# Patient Record
Sex: FEMALE | Race: WHITE | Employment: UNEMPLOYED | URBAN - METROPOLITAN AREA
[De-identification: names, ages, dates, MRNs, and addresses within clinical notes are randomized per-mention and may not be internally consistent; named-entity substitution may affect disease eponyms.]

---

## 2018-11-10 ENCOUNTER — APPOINTMENT (OUTPATIENT)
Dept: GENERAL RADIOLOGY | Age: 58
End: 2018-11-10
Payer: MEDICARE

## 2018-11-10 ENCOUNTER — HOSPITAL ENCOUNTER (EMERGENCY)
Age: 58
Discharge: HOME | End: 2018-11-10
Payer: MEDICARE

## 2018-11-10 VITALS
TEMPERATURE: 98.6 F | SYSTOLIC BLOOD PRESSURE: 155 MMHG | HEART RATE: 80 BPM | RESPIRATION RATE: 16 BRPM | OXYGEN SATURATION: 95 % | BODY MASS INDEX: 31.18 KG/M2 | HEIGHT: 66 IN | WEIGHT: 194 LBS | DIASTOLIC BLOOD PRESSURE: 83 MMHG

## 2018-11-10 DIAGNOSIS — M79.672 LEFT FOOT PAIN: Primary | ICD-10-CM

## 2018-11-10 PROCEDURE — 99283 EMERGENCY DEPT VISIT LOW MDM: CPT

## 2018-11-10 PROCEDURE — 73630 X-RAY EXAM OF FOOT: CPT

## 2018-11-10 RX ORDER — IBUPROFEN 600 MG/1
600 TABLET ORAL ONCE
Status: DISCONTINUED | OUTPATIENT
Start: 2018-11-10 | End: 2018-11-10 | Stop reason: HOSPADM

## 2018-11-10 ASSESSMENT — PAIN DESCRIPTION - LOCATION: LOCATION: FOOT

## 2018-11-10 ASSESSMENT — PAIN DESCRIPTION - ORIENTATION: ORIENTATION: LEFT

## 2018-11-10 ASSESSMENT — PAIN DESCRIPTION - PAIN TYPE: TYPE: ACUTE PAIN

## 2018-11-10 ASSESSMENT — PAIN SCALES - GENERAL: PAINLEVEL_OUTOF10: 6

## 2018-11-11 ASSESSMENT — ENCOUNTER SYMPTOMS
NAUSEA: 0
VOMITING: 0

## 2018-11-11 NOTE — ED PROVIDER NOTES
ALCOHOL, ANESTHETIC, (ZILACTIN) 10 % GEL Take by mouth       !! - Potential duplicate medications found. Please discuss with provider. Review of Systems:  Review of Systems   Constitutional: Negative for activity change, appetite change and fever. Gastrointestinal: Negative for nausea and vomiting. Musculoskeletal: Positive for arthralgias. Skin: Negative for wound. Neurological: Negative for weakness and numbness. Positives and Pertinent negatives as per HPI. Except as noted above in the ROS, problem specific ROS was completed and is negative. Physical Exam:  Physical Exam   Constitutional: She is oriented to person, place, and time. She appears well-developed and well-nourished. HENT:   Head: Normocephalic and atraumatic. Right Ear: External ear normal.   Left Ear: External ear normal.   Nose: Nose normal.   Eyes: Right eye exhibits no discharge. Left eye exhibits no discharge. Neck: Normal range of motion. Neck supple. No tracheal deviation present. Cardiovascular: Intact distal pulses. Pulmonary/Chest: Effort normal. No respiratory distress. Musculoskeletal: Normal range of motion. There is minimal tenderness over the dorsum of the left foot, there is no overlying erythema, edema, ecchymosis or warmth. Dorsalis pedis and posterior tibialis pulse are 2+. Normal sensation light touch. Neurologically intact. Neurological: She is alert and oriented to person, place, and time. Skin: Skin is warm and dry. She is not diaphoretic. Psychiatric: She has a normal mood and affect. Her behavior is normal.   Nursing note and vitals reviewed.       MEDICAL DECISION MAKING    Vitals:    Vitals:    11/10/18 2036 11/10/18 2151   BP: (!) 156/107 (!) 155/83   Pulse: 80 80   Resp: 16    Temp: 98.6 °F (37 °C)    TempSrc: Infrared    SpO2: 95%    Weight: 194 lb (88 kg)    Height: 5' 6\" (1.676 m)        LABS:Labs Reviewed - No data to display     Remainder of labs reviewed and

## 2020-03-09 ENCOUNTER — APPOINTMENT (OUTPATIENT)
Dept: CT IMAGING | Age: 60
End: 2020-03-09
Payer: MEDICARE

## 2020-03-09 ENCOUNTER — APPOINTMENT (OUTPATIENT)
Dept: GENERAL RADIOLOGY | Age: 60
End: 2020-03-09
Payer: MEDICARE

## 2020-03-09 ENCOUNTER — HOSPITAL ENCOUNTER (EMERGENCY)
Age: 60
Discharge: HOME | End: 2020-03-09
Attending: EMERGENCY MEDICINE
Payer: MEDICARE

## 2020-03-09 VITALS
DIASTOLIC BLOOD PRESSURE: 78 MMHG | TEMPERATURE: 98.3 F | HEART RATE: 77 BPM | SYSTOLIC BLOOD PRESSURE: 129 MMHG | OXYGEN SATURATION: 99 % | RESPIRATION RATE: 18 BRPM

## 2020-03-09 LAB
A/G RATIO: 1.2 (ref 1.1–2.2)
ALBUMIN SERPL-MCNC: 3.5 G/DL (ref 3.4–5)
ALP BLD-CCNC: 59 U/L (ref 40–129)
ALT SERPL-CCNC: 19 U/L (ref 10–40)
ANION GAP SERPL CALCULATED.3IONS-SCNC: 14 MMOL/L (ref 3–16)
AST SERPL-CCNC: 34 U/L (ref 15–37)
BACTERIA: ABNORMAL /HPF
BASOPHILS ABSOLUTE: 0 K/UL (ref 0–0.2)
BASOPHILS RELATIVE PERCENT: 0.2 %
BILIRUB SERPL-MCNC: 0.3 MG/DL (ref 0–1)
BILIRUBIN URINE: NEGATIVE
BLOOD, URINE: NEGATIVE
BUN BLDV-MCNC: 13 MG/DL (ref 7–20)
CALCIUM SERPL-MCNC: 8.4 MG/DL (ref 8.3–10.6)
CASTS: ABNORMAL /LPF
CHLORIDE BLD-SCNC: 97 MMOL/L (ref 99–110)
CLARITY: ABNORMAL
CO2: 20 MMOL/L (ref 21–32)
COLOR: YELLOW
CREAT SERPL-MCNC: 1.2 MG/DL (ref 0.6–1.1)
EOSINOPHILS ABSOLUTE: 0 K/UL (ref 0–0.6)
EOSINOPHILS RELATIVE PERCENT: 0 %
EPITHELIAL CELLS, UA: 4 /HPF (ref 0–5)
GFR AFRICAN AMERICAN: 56
GFR NON-AFRICAN AMERICAN: 46
GLOBULIN: 3 G/DL
GLUCOSE BLD-MCNC: 92 MG/DL (ref 70–99)
GLUCOSE URINE: NEGATIVE MG/DL
HCT VFR BLD CALC: 35.5 % (ref 36–48)
HEMOGLOBIN: 11.5 G/DL (ref 12–16)
KETONES, URINE: NEGATIVE MG/DL
LEUKOCYTE ESTERASE, URINE: ABNORMAL
LYMPHOCYTES ABSOLUTE: 0.7 K/UL (ref 1–5.1)
LYMPHOCYTES RELATIVE PERCENT: 20.6 %
MCH RBC QN AUTO: 28.3 PG (ref 26–34)
MCHC RBC AUTO-ENTMCNC: 32.5 G/DL (ref 31–36)
MCV RBC AUTO: 86.9 FL (ref 80–100)
MICROSCOPIC EXAMINATION: YES
MONOCYTES ABSOLUTE: 0.7 K/UL (ref 0–1.3)
MONOCYTES RELATIVE PERCENT: 21 %
NEUTROPHILS ABSOLUTE: 2 K/UL (ref 1.7–7.7)
NEUTROPHILS RELATIVE PERCENT: 58.2 %
NITRITE, URINE: NEGATIVE
PDW BLD-RTO: 15.3 % (ref 12.4–15.4)
PH UA: 6 (ref 5–8)
PLATELET # BLD: 128 K/UL (ref 135–450)
PMV BLD AUTO: 7.1 FL (ref 5–10.5)
POTASSIUM REFLEX MAGNESIUM: 4.1 MMOL/L (ref 3.5–5.1)
PRO-BNP: 169 PG/ML (ref 0–124)
PROTEIN UA: 30 MG/DL
RBC # BLD: 4.09 M/UL (ref 4–5.2)
RBC UA: 2 /HPF (ref 0–4)
SODIUM BLD-SCNC: 131 MMOL/L (ref 136–145)
SPECIFIC GRAVITY UA: 1.01 (ref 1–1.03)
TOTAL PROTEIN: 6.5 G/DL (ref 6.4–8.2)
TROPONIN: <0.01 NG/ML
URINE REFLEX TO CULTURE: YES
URINE TYPE: ABNORMAL
UROBILINOGEN, URINE: 0.2 E.U./DL
WBC # BLD: 3.5 K/UL (ref 4–11)
WBC UA: 15 /HPF (ref 0–5)

## 2020-03-09 PROCEDURE — 85025 COMPLETE CBC W/AUTO DIFF WBC: CPT

## 2020-03-09 PROCEDURE — 84484 ASSAY OF TROPONIN QUANT: CPT

## 2020-03-09 PROCEDURE — 70450 CT HEAD/BRAIN W/O DYE: CPT

## 2020-03-09 PROCEDURE — 80053 COMPREHEN METABOLIC PANEL: CPT

## 2020-03-09 PROCEDURE — 99284 EMERGENCY DEPT VISIT MOD MDM: CPT

## 2020-03-09 PROCEDURE — 81001 URINALYSIS AUTO W/SCOPE: CPT

## 2020-03-09 PROCEDURE — 71045 X-RAY EXAM CHEST 1 VIEW: CPT

## 2020-03-09 PROCEDURE — 83880 ASSAY OF NATRIURETIC PEPTIDE: CPT

## 2020-03-09 PROCEDURE — 73562 X-RAY EXAM OF KNEE 3: CPT

## 2020-03-09 PROCEDURE — 93005 ELECTROCARDIOGRAM TRACING: CPT | Performed by: PHYSICIAN ASSISTANT

## 2020-03-09 PROCEDURE — 87086 URINE CULTURE/COLONY COUNT: CPT

## 2020-03-09 PROCEDURE — 72125 CT NECK SPINE W/O DYE: CPT

## 2020-03-09 PROCEDURE — 6370000000 HC RX 637 (ALT 250 FOR IP): Performed by: EMERGENCY MEDICINE

## 2020-03-09 RX ORDER — CIPROFLOXACIN 500 MG/1
500 TABLET, FILM COATED ORAL ONCE
Status: COMPLETED | OUTPATIENT
Start: 2020-03-09 | End: 2020-03-09

## 2020-03-09 RX ORDER — CIPROFLOXACIN 500 MG/1
500 TABLET, FILM COATED ORAL 2 TIMES DAILY
Qty: 20 TABLET | Refills: 0 | Status: SHIPPED | OUTPATIENT
Start: 2020-03-09 | End: 2020-03-19

## 2020-03-09 RX ORDER — ACETAMINOPHEN 500 MG
1000 TABLET ORAL ONCE
Status: COMPLETED | OUTPATIENT
Start: 2020-03-09 | End: 2020-03-09

## 2020-03-09 RX ADMIN — ACETAMINOPHEN 1000 MG: 500 TABLET, FILM COATED ORAL at 17:20

## 2020-03-09 RX ADMIN — CIPROFLOXACIN HYDROCHLORIDE 500 MG: 500 TABLET, FILM COATED ORAL at 17:20

## 2020-03-09 NOTE — ED NOTES
PT placed in wheel chair and given warm blanket. Discharge instructions reviewed. PT placed in main ED waiting room for transportation to pick her up.    remains with PT.      Aaliyah Ortiz RN  03/09/20 7718

## 2020-03-09 NOTE — ED NOTES
PT unable to understand and/or answer questions appropriately. PT states she was falling down all the time, then stated this was the first time. PT unsure if she was in a chair or going to the restroom when she fell. History of dementia. PT unable to rate pain but consistently states it is her right knee that hurts. PT kellymike removed. Small abrasion to right knee.       Juliane Contreras RN  03/09/20 [address] [facility],[address], RN  03/09/20 2468       Juliane Contreras RN  03/09/20 5136

## 2020-03-09 NOTE — ED PROVIDER NOTES
[facility]        Pt Name: Emeli Mcadams  MRN: [id number]  [place] 1960  Date of evaluation: 3/9/2020  Provider: MAHENDRA Izaguirre  PCP: Serenity Hansen have seen and evaluated this patient with my supervising physician Sandie Sanchez       Chief Complaint   Patient presents with    Knee Pain     PT arrived via [address] with c/o right knee pain. PT states she fell but unsure of how or when. Hx deminitia. Caregiver unsure of PT falling. HISTORY OF PRESENT ILLNESS   (Location, Timing/Onset, Context/Setting, Quality, Duration, Modifying Factors, Severity, Associated Signs and Symptoms)  Note limiting factors. Emeli Mcadams is a 61 y.o. female with past medical history of dementia, GERD, schizophrenia, seizures who presents to the ED with complaint of knee pain. Patient has history of dementia and had a fall. Care provider unsure how patient fell. Patient states she felt dizzy before she fell. States she injured her right knee. Denies abrasion or laceration. Denies head trauma or loss of conscious. Patient poor historian given history of dementia. Denies chest pain, shortness of breath, nausea/vomiting, visual changes, speech services, numbness/tingling or lightheadedness/dizziness this time. Denies abdominal pain, urinary symptoms or changes in bowel movements. Denies any other injury or trauma throughout. No documented blood thinners. Nursing Notes were all reviewed and agreed with or any disagreements were addressed in the HPI. REVIEW OF SYSTEMS    (2-9 systems for level 4, 10 or more for level 5)     Review of Systems   Unable to perform ROS: Dementia       Positives and Pertinent negatives as per HPI. Except as noted above in the ROS, all other systems were reviewed and negative.        PAST MEDICAL HISTORY     Past Medical History:   Diagnosis Date    Arthritis     Dementia ([facility]     DYSPHAGIA, OROPHARYNGEAL     GERD (gastroesophageal reflux disease)     Hypocalcemia     Hypokalemia     Pancreatitis     Schizophrenia, paranoid type ([facility]     Seizures ([facility]     Vitamin deficiency          SURGICAL HISTORY     Past Surgical History:   Procedure Laterality Date    CHOLECYSTECTOMY      HYSTERECTOMY      TYMPANOPLASTY           CURRENTMEDICATIONS       Discharge Medication List as of 3/9/2020  5:17 PM      CONTINUE these medications which have NOT CHANGED    Details   sodium chloride (OCEAN, BABY AYR) 0.65 % nasal spray 1 spray by Nasal route dailyHistorical Med      !! lactulose (CHRONULAC) 10 GM/15ML solution Take 20 g by mouth 2 times dailyHistorical Med      ciprofloxacin-dexamethasone (CIPRODEX) 0.3-0.1 % otic suspension Place 4 drops into the left ear dailyHistorical Med      !! lamoTRIgine (LAMICTAL) 100 MG tablet Take 100 mg by mouth daily morningHistorical Med      phenol 1.4 % LIQD mouth spray Take 1 drop by mouth every 2 hours as needed for Sore ThroatHistorical Med      ibuprofen (ADVIL;MOTRIN) 400 MG tablet Take 400 mg by mouth every 12 hours as needed for PainHistorical Med      promethazine (PHENERGAN) 12.5 MG tablet Take 12.5 mg by mouth every 6 hours as needed for NauseaHistorical Med      BENZYL ALCOHOL, ANESTHETIC, (ZILACTIN) 10 % GEL Take by mouth      acetaminophen (TYLENOL) 325 MG tablet Take 650 mg by mouth every 6 hours as needed. aluminum & magnesium hydroxide-simethicone (MYLANTA) 400-400-40 MG/5ML SUSP Take 30 mLs by mouth every 4 hours as needed. benztropine (COGENTIN) 1 MG tablet Take 1 mg by mouth nightly. docusate sodium (COLACE) 100 MG capsule Take 100 mg by mouth 2 times daily. !! lactulose encephalopathy (ENULOSE) 10 GM/15ML SOLN Take 30 mLs by mouth 2 times daily. famotidine (PEPCID) 20 MG tablet Take 20 mg by mouth daily. folic acid (FOLVITE) 1 MG tablet Take 1 mg by mouth daily.       potassium chloride SA 1. Fall from slip, trip, or stumble, initial encounter    2. Contusion of right knee, initial encounter    3. Acute cystitis without hematuria          DISPOSITION/PLAN   DISPOSITION Decision To Discharge 03/09/2020 05:07:51 PM      PATIENT REFERREDTO:  Chaparro Espinoza    Schedule an appointment as soon as possible for a visit   For a Re-check in  3-5     days.     [facility] Emergency Department  [address] [facility]  [address] [facility] [address]  [phone]  Go to   As needed, If symptoms worsen      DISCHARGE MEDICATIONS:  Discharge Medication List as of 3/9/2020  5:17 PM      START taking these medications    Details   ciprofloxacin (CIPRO) 500 MG tablet Take 1 tablet by mouth 2 times daily for 10 days, Disp-20 tablet, R-0Print             DISCONTINUED MEDICATIONS:  Discharge Medication List as of 3/9/2020  5:17 PM                 (Please note that portions of this note were completed with a voice recognition program.  Efforts were made to edit the dictations but occasionally words are mis-transcribed.)    MAHENDRA Kay (electronically signed)          MAHENDRA De La Garza  03/09/20 2014

## 2020-03-09 NOTE — ED NOTES
PT medicated. Facility rep arranging for transportation back to facility. PIV removed, PT dressed.       Juliane Contreras RN  03/09/20 4251

## 2020-03-09 NOTE — ED PROVIDER NOTES
[facility] Emergency Department      Pt Name: Adam Hidalgo  MRN: [id number]  [place] 1960  Date of evaluation: 3/9/2020  Provider: Gust Nageotte, MD  I independently performed a history and physical on Adam Hidalgo. All diagnostic, treatment, and disposition decisions were made by myself in conjunction with the advanced practice provider. HPI: Adam Hidalgo presented with   Chief Complaint   Patient presents with    Knee Pain     PT arrived via [address] with c/o right knee pain. PT states she fell but unsure of how or when. Hx deminitia. Caregiver unsure of PT falling. Adam Hidalgo has a past medical history of Arthritis, Dementia ([address] DYSPHAGIA, OROPHARYNGEAL, GERD (gastroesophageal reflux disease), Hypocalcemia, Hypokalemia, Pancreatitis, Schizophrenia, paranoid type ([address] Seizures ([address] and Vitamin deficiency. She has a past surgical history that includes Tympanoplasty; Cholecystectomy; and Hysterectomy. No current facility-administered medications on file prior to encounter. Current Outpatient Medications on File Prior to Encounter   Medication Sig Dispense Refill    sodium chloride (OCEAN, BABY AYR) 0.65 % nasal spray 1 spray by Nasal route daily      lactulose (CHRONULAC) 10 GM/15ML solution Take 20 g by mouth 2 times daily      ciprofloxacin-dexamethasone (CIPRODEX) 0.3-0.1 % otic suspension Place 4 drops into the left ear daily      lamoTRIgine (LAMICTAL) 100 MG tablet Take 100 mg by mouth daily morning      phenol 1.4 % LIQD mouth spray Take 1 drop by mouth every 2 hours as needed for Sore Throat      ibuprofen (ADVIL;MOTRIN) 400 MG tablet Take 400 mg by mouth every 12 hours as needed for Pain      promethazine (PHENERGAN) 12.5 MG tablet Take 12.5 mg by mouth every 6 hours as needed for Nausea      BENZYL ALCOHOL, ANESTHETIC, (ZILACTIN) 10 % GEL Take by mouth      acetaminophen (TYLENOL) 325 MG tablet Take 650 mg by mouth every 6 hours as needed.       aluminum & magnesium hydroxide-simethicone (MYLANTA) 400-400-40 MG/5ML SUSP Take 30 mLs by mouth every 4 hours as needed.  benztropine (COGENTIN) 1 MG tablet Take 1 mg by mouth nightly.  docusate sodium (COLACE) 100 MG capsule Take 100 mg by mouth 2 times daily.  lactulose encephalopathy (ENULOSE) 10 GM/15ML SOLN Take 30 mLs by mouth 2 times daily.  famotidine (PEPCID) 20 MG tablet Take 20 mg by mouth daily.  folic acid (FOLVITE) 1 MG tablet Take 1 mg by mouth daily.  potassium chloride SA (KLOR-CON M20) 20 MEQ tablet Take 20 mEq by mouth daily.  lamoTRIgine (LAMICTAL) 150 MG tablet Take 150 mg by mouth every evening.  levETIRAcetam (KEPPRA) 750 MG tablet Take 750 mg by mouth 2 times daily.  LORazepam (ATIVAN) 1 MG tablet Take 1 mg by mouth 2 times daily.  calcium-vitamin D (OSCAL-500) 500-200 MG-UNIT per tablet Take 1 tablet by mouth 2 times daily.  risperiDONE (RISPERDAL) 3 MG tablet Take 3 mg by mouth 2 times daily.  sertraline (ZOLOFT) 100 MG tablet Take 100 mg by mouth daily.  vitamin D (CHOLECALCIFEROL) 400 UNITS TABS tablet Take 400 Units by mouth 2 times daily.  vitamin E 400 UNIT capsule Take 400 Units by mouth daily. PHYSICAL EXAM  Vitals: /78   Pulse 73   Temp 98.3 °F (36.8 °C) (Infrared)   Resp 18   SpO2 96%   Constitutional:  61 y.o. female alert  HENT:  Atraumatic, oral mucosa moist  Neck:  No visible JVD, supple  Chest/Lungs:  Respiratory effort normal, faint wheeze, regular  Abdomen:  Non-distended, soft, NT  Back:  No gross deformity  Extremities:  Normal tone and perfusion, abrasion to right knee, no sts    Medical Decision Making and Plan: Briefly, this is an 61 y. o.female who presented with concern for fall. She lives in a assisted facility and the supervisor from the facility is with her. The patient does ambulate with a walker at times. She took the fall today. She has had some frequency of urination.

## 2020-03-09 NOTE — ED NOTES
Bed: 29  Expected date:   Expected time:   Means of arrival:   Comments:  [place] knee pain     Marsha Purvis RN  03/09/20 9438

## 2020-03-10 LAB — URINE CULTURE, ROUTINE: NORMAL

## 2020-03-11 LAB
EKG ATRIAL RATE: 71 BPM
EKG DIAGNOSIS: NORMAL
EKG P AXIS: 39 DEGREES
EKG P-R INTERVAL: 158 MS
EKG Q-T INTERVAL: 394 MS
EKG QRS DURATION: 86 MS
EKG QTC CALCULATION (BAZETT): 428 MS
EKG R AXIS: 61 DEGREES
EKG T AXIS: 51 DEGREES
EKG VENTRICULAR RATE: 71 BPM

## 2020-03-11 PROCEDURE — 93010 ELECTROCARDIOGRAM REPORT: CPT | Performed by: INTERNAL MEDICINE

## 2020-03-23 PROBLEM — I26.99 PULMONARY EMBOLUS (HCC): Status: ACTIVE | Noted: 2020-03-23

## 2020-03-28 ENCOUNTER — APPOINTMENT (OUTPATIENT)
Dept: GENERAL RADIOLOGY | Age: 60
End: 2020-03-28
Payer: MEDICARE

## 2020-03-28 ENCOUNTER — HOSPITAL ENCOUNTER (EMERGENCY)
Age: 60
Discharge: HOME | End: 2020-03-28
Attending: EMERGENCY MEDICINE
Payer: MEDICARE

## 2020-03-28 VITALS
SYSTOLIC BLOOD PRESSURE: 131 MMHG | RESPIRATION RATE: 15 BRPM | HEART RATE: 67 BPM | TEMPERATURE: 98 F | HEIGHT: 66 IN | WEIGHT: 194 LBS | DIASTOLIC BLOOD PRESSURE: 84 MMHG | OXYGEN SATURATION: 93 % | BODY MASS INDEX: 31.18 KG/M2

## 2020-03-28 LAB
A/G RATIO: 1.1 (ref 1.1–2.2)
ALBUMIN SERPL-MCNC: 3.9 G/DL (ref 3.4–5)
ALP BLD-CCNC: 72 U/L (ref 40–129)
ALT SERPL-CCNC: 22 U/L (ref 10–40)
ANION GAP SERPL CALCULATED.3IONS-SCNC: 11 MMOL/L (ref 3–16)
APTT: 41.5 SEC (ref 24.2–36.2)
AST SERPL-CCNC: 23 U/L (ref 15–37)
BASOPHILS ABSOLUTE: 0 K/UL (ref 0–0.2)
BASOPHILS RELATIVE PERCENT: 0.2 %
BILIRUB SERPL-MCNC: 0.3 MG/DL (ref 0–1)
BUN BLDV-MCNC: 19 MG/DL (ref 7–20)
CALCIUM SERPL-MCNC: 9.6 MG/DL (ref 8.3–10.6)
CHLORIDE BLD-SCNC: 105 MMOL/L (ref 99–110)
CO2: 22 MMOL/L (ref 21–32)
CREAT SERPL-MCNC: 1.2 MG/DL (ref 0.6–1.1)
EOSINOPHILS ABSOLUTE: 0 K/UL (ref 0–0.6)
EOSINOPHILS RELATIVE PERCENT: 0 %
GFR AFRICAN AMERICAN: 56
GFR NON-AFRICAN AMERICAN: 46
GLOBULIN: 3.5 G/DL
GLUCOSE BLD-MCNC: 88 MG/DL (ref 70–99)
HCT VFR BLD CALC: 39.7 % (ref 36–48)
HEMOGLOBIN: 13.1 G/DL (ref 12–16)
INR BLD: 2.98 (ref 0.86–1.14)
LYMPHOCYTES ABSOLUTE: 0.6 K/UL (ref 1–5.1)
LYMPHOCYTES RELATIVE PERCENT: 14.5 %
MCH RBC QN AUTO: 28.2 PG (ref 26–34)
MCHC RBC AUTO-ENTMCNC: 33 G/DL (ref 31–36)
MCV RBC AUTO: 85.6 FL (ref 80–100)
MONOCYTES ABSOLUTE: 0.5 K/UL (ref 0–1.3)
MONOCYTES RELATIVE PERCENT: 10.4 %
NEUTROPHILS ABSOLUTE: 3.3 K/UL (ref 1.7–7.7)
NEUTROPHILS RELATIVE PERCENT: 74.9 %
PDW BLD-RTO: 15.3 % (ref 12.4–15.4)
PLATELET # BLD: 184 K/UL (ref 135–450)
PMV BLD AUTO: 6.9 FL (ref 5–10.5)
POTASSIUM SERPL-SCNC: 4.9 MMOL/L (ref 3.5–5.1)
PRO-BNP: 114 PG/ML (ref 0–124)
PROTHROMBIN TIME: 35 SEC (ref 10–13.2)
RBC # BLD: 4.63 M/UL (ref 4–5.2)
SODIUM BLD-SCNC: 138 MMOL/L (ref 136–145)
TOTAL PROTEIN: 7.4 G/DL (ref 6.4–8.2)
TROPONIN: <0.01 NG/ML
WBC # BLD: 4.4 K/UL (ref 4–11)

## 2020-03-28 PROCEDURE — 85610 PROTHROMBIN TIME: CPT

## 2020-03-28 PROCEDURE — 93005 ELECTROCARDIOGRAM TRACING: CPT | Performed by: PHYSICIAN ASSISTANT

## 2020-03-28 PROCEDURE — 96374 THER/PROPH/DIAG INJ IV PUSH: CPT

## 2020-03-28 PROCEDURE — 71045 X-RAY EXAM CHEST 1 VIEW: CPT

## 2020-03-28 PROCEDURE — 84484 ASSAY OF TROPONIN QUANT: CPT

## 2020-03-28 PROCEDURE — 85025 COMPLETE CBC W/AUTO DIFF WBC: CPT

## 2020-03-28 PROCEDURE — 99285 EMERGENCY DEPT VISIT HI MDM: CPT

## 2020-03-28 PROCEDURE — 83880 ASSAY OF NATRIURETIC PEPTIDE: CPT

## 2020-03-28 PROCEDURE — 6370000000 HC RX 637 (ALT 250 FOR IP): Performed by: PHYSICIAN ASSISTANT

## 2020-03-28 PROCEDURE — 96375 TX/PRO/DX INJ NEW DRUG ADDON: CPT

## 2020-03-28 PROCEDURE — 80053 COMPREHEN METABOLIC PANEL: CPT

## 2020-03-28 PROCEDURE — 6360000002 HC RX W HCPCS: Performed by: PHYSICIAN ASSISTANT

## 2020-03-28 PROCEDURE — 85730 THROMBOPLASTIN TIME PARTIAL: CPT

## 2020-03-28 RX ORDER — ORPHENADRINE CITRATE 30 MG/ML
60 INJECTION INTRAMUSCULAR; INTRAVENOUS ONCE
Status: COMPLETED | OUTPATIENT
Start: 2020-03-28 | End: 2020-03-28

## 2020-03-28 RX ORDER — ONDANSETRON 2 MG/ML
4 INJECTION INTRAMUSCULAR; INTRAVENOUS ONCE
Status: COMPLETED | OUTPATIENT
Start: 2020-03-28 | End: 2020-03-28

## 2020-03-28 RX ORDER — LIDOCAINE 50 MG/G
1 PATCH TOPICAL DAILY
Qty: 30 PATCH | Refills: 0 | Status: SHIPPED | OUTPATIENT
Start: 2020-03-28

## 2020-03-28 RX ORDER — ACETAMINOPHEN 325 MG/1
650 TABLET ORAL EVERY 6 HOURS PRN
Qty: 120 TABLET | Refills: 0 | Status: SHIPPED | OUTPATIENT
Start: 2020-03-28

## 2020-03-28 RX ORDER — WARFARIN SODIUM 6 MG/1
6 TABLET ORAL ONCE
COMMUNITY
Start: 2020-03-20

## 2020-03-28 RX ORDER — HYDROCODONE BITARTRATE AND ACETAMINOPHEN 5; 325 MG/1; MG/1
1 TABLET ORAL ONCE
Status: COMPLETED | OUTPATIENT
Start: 2020-03-28 | End: 2020-03-28

## 2020-03-28 RX ORDER — UBIDECARENONE 100 MG
100 CAPSULE ORAL 2 TIMES DAILY
COMMUNITY

## 2020-03-28 RX ORDER — SODIUM PHOSPHATE, DIBASIC AND SODIUM PHOSPHATE, MONOBASIC 7; 19 G/133ML; G/133ML
1 ENEMA RECTAL
COMMUNITY

## 2020-03-28 RX ORDER — METHOCARBAMOL 750 MG/1
750 TABLET, FILM COATED ORAL 4 TIMES DAILY
Qty: 40 TABLET | Refills: 0 | Status: SHIPPED | OUTPATIENT
Start: 2020-03-28 | End: 2020-04-07

## 2020-03-28 RX ORDER — MAGNESIUM OXIDE 400 MG/1
600 TABLET ORAL DAILY
COMMUNITY

## 2020-03-28 RX ORDER — LIDOCAINE 4 G/G
1 PATCH TOPICAL ONCE
Status: DISCONTINUED | OUTPATIENT
Start: 2020-03-28 | End: 2020-03-28 | Stop reason: HOSPADM

## 2020-03-28 RX ORDER — BISACODYL 10 MG
10 SUPPOSITORY, RECTAL RECTAL DAILY PRN
COMMUNITY

## 2020-03-28 RX ADMIN — ORPHENADRINE CITRATE 60 MG: 30 INJECTION INTRAMUSCULAR; INTRAVENOUS at 12:22

## 2020-03-28 RX ADMIN — ONDANSETRON 4 MG: 2 INJECTION INTRAMUSCULAR; INTRAVENOUS at 12:22

## 2020-03-28 RX ADMIN — HYDROCODONE BITARTRATE AND ACETAMINOPHEN 1 TABLET: 5; 325 TABLET ORAL at 12:20

## 2020-03-28 ASSESSMENT — PAIN SCALES - GENERAL
PAINLEVEL_OUTOF10: 5
PAINLEVEL_OUTOF10: 2

## 2020-03-28 ASSESSMENT — ENCOUNTER SYMPTOMS
SHORTNESS OF BREATH: 0
VOMITING: 0
ABDOMINAL PAIN: 0
CONSTIPATION: 0
DIARRHEA: 0
COLOR CHANGE: 0
NAUSEA: 0
RESPIRATORY NEGATIVE: 1
COUGH: 0
BACK PAIN: 0

## 2020-03-28 ASSESSMENT — PAIN DESCRIPTION - PAIN TYPE: TYPE: ACUTE PAIN

## 2020-03-28 NOTE — ED PROVIDER NOTES
[facility]        Pt Name: Anupama Spain  MRN: [id number]  [place] 1960  Date of evaluation: 3/28/2020  Provider: MAHENDRA Noonan  PCP: Moreno Alves     I have seen and evaluated this patient with my supervising physician Yvonne Paige, [facility]       Chief Complaint   Patient presents with    Neck Pain     Pt to ER via EMS from [address] [place] [address] for complaints of acute R sided neck, shoulder and arm pain that started overnight. Pt was hospitalized recently for blood clots in her lungs, and group home staff concerned she may now have one in her arm. HISTORY OF PRESENT ILLNESS   (Location, Timing/Onset, Context/Setting, Quality, Duration, Modifying Factors, Severity, Associated Signs and Symptoms)  Note limiting factors. Anupama pSain is a 61 y.o. female with past medical history of dementia, GERD, schizophrenia, seizures who presents to the ED with complaint of neck pain. Patient arrived from group home with complaints of right-sided neck pain, right shoulder pain and arm pain worsened with movement. Patient states started overnight. Denies any injury or trauma. Was recently hospitalized in [place] [address] at [facility] and has documentation sent from facility where she was admitted for influenza and bilateral PEs. Patient is currently anticoagulated on Coumadin per review of records. Patient denies chest pain or shortness of breath. States she has pain to her arm and facility was concerned that she may have a blood clot in her arm and sent to the ED for further evaluation and treatment. Denies edema, ecchymosis, erythema or warmth. Denies fever chills. Denies numbness or tingling. Denies abrasion or laceration. Denies decreased range of motion or strength. Denies abdominal pain, nausea/vomiting, urinary symptoms or changes in bowel movements.   States aching pain rated 5/10 to the arm worsened with movement. Nursing Notes were all reviewed and agreed with or any disagreements were addressed in the HPI. REVIEW OF SYSTEMS    (2-9 systems for level 4, 10 or more for level 5)     Review of Systems   Constitutional: Negative for activity change, appetite change, chills and fever. Respiratory: Negative. Negative for cough and shortness of breath. Cardiovascular: Negative. Negative for chest pain. Gastrointestinal: Negative for abdominal pain, constipation, diarrhea, nausea and vomiting. Genitourinary: Negative for difficulty urinating and dysuria. Musculoskeletal: Positive for arthralgias, myalgias and neck pain. Negative for back pain, gait problem, joint swelling and neck stiffness. Skin: Negative for color change, pallor, rash and wound. Neurological: Negative for dizziness, weakness, light-headedness, numbness and headaches. Positives and Pertinent negatives as per HPI. Except as noted above in the ROS, all other systems were reviewed and negative. PAST MEDICAL HISTORY     Past Medical History:   Diagnosis Date    Arthritis     Dementia ([facility] [facility]     DYSPHAGIA, OROPHARYNGEAL     GERD (gastroesophageal reflux disease)     Hypocalcemia     Hypokalemia     Pancreatitis     Schizophrenia, paranoid type ([facility]     Seizures ([facility]     Vitamin deficiency          SURGICAL HISTORY     Past Surgical History:   Procedure Laterality Date    CHOLECYSTECTOMY      HYSTERECTOMY      TYMPANOPLASTY           CURRENTMEDICATIONS       Previous Medications    ACETAMINOPHEN (TYLENOL) 325 MG TABLET    Take 650 mg by mouth every 6 hours as needed. ALUMINUM & MAGNESIUM HYDROXIDE-SIMETHICONE (MYLANTA) 400-400-40 MG/5ML SUSP    Take 30 mLs by mouth every 4 hours as needed. BENZTROPINE (COGENTIN) 1 MG TABLET    Take 1 mg by mouth nightly.     BENZYL ALCOHOL, ANESTHETIC, (ZILACTIN) 10 % GEL    Take by mouth    BISACODYL (DULCOLAX) 10 MG SUPPOSITORY    Place 10 mg rectally daily as needed for Constipation    BISACODYL (DULCOLAX) 5 MG EC TABLET    Take 10 mg by mouth daily as needed for Constipation    CALCIUM-VITAMIN D (OSCAL-500) 500-200 MG-UNIT PER TABLET    Take 1 tablet by mouth 2 times daily. CIPROFLOXACIN-DEXAMETHASONE (CIPRODEX) 0.3-0.1 % OTIC SUSPENSION    Place 4 drops into the left ear daily    COENZYME Q10 100 MG CAPS CAPSULE    Take 100 mg by mouth 2 times daily    DOCUSATE SODIUM (COLACE) 100 MG CAPSULE    Take 100 mg by mouth 2 times daily. FAMOTIDINE (PEPCID) 20 MG TABLET    Take 20 mg by mouth daily. FOLIC ACID (FOLVITE) 1 MG TABLET    Take 1 mg by mouth daily. IBUPROFEN (ADVIL;MOTRIN) 400 MG TABLET    Take 400 mg by mouth every 12 hours as needed for Pain    LACTULOSE (CHRONULAC) 10 GM/15ML SOLUTION    Take 20 g by mouth 2 times daily    LACTULOSE ENCEPHALOPATHY (ENULOSE) 10 GM/15ML SOLN    Take 30 mLs by mouth 2 times daily. LAMOTRIGINE (LAMICTAL) 100 MG TABLET    Take 100 mg by mouth daily morning    LAMOTRIGINE (LAMICTAL) 150 MG TABLET    Take 150 mg by mouth every evening. LEVETIRACETAM (KEPPRA) 750 MG TABLET    Take 750 mg by mouth 2 times daily. LORAZEPAM (ATIVAN) 1 MG TABLET    Take 1 mg by mouth 2 times daily. MAGNESIUM OXIDE (MAG-OX) 400 MG TABLET    Take 600 mg by mouth daily    PHENOL 1.4 % LIQD MOUTH SPRAY    Take 1 drop by mouth every 2 hours as needed for Sore Throat    POTASSIUM CHLORIDE SA (KLOR-CON M20) 20 MEQ TABLET    Take 20 mEq by mouth daily. PROMETHAZINE (PHENERGAN) 12.5 MG TABLET    Take 12.5 mg by mouth every 6 hours as needed for Nausea    RISPERIDONE (RISPERDAL) 2 MG TABLET    Take 2 mg by mouth 2 times daily     SERTRALINE (ZOLOFT) 100 MG TABLET    Take 100 mg by mouth daily.     SODIUM CHLORIDE (OCEAN, BABY AYR) 0.65 % NASAL SPRAY    1 spray by Nasal route daily    SODIUM PHOSPHATES (FLEET) 7-19 GM/118ML    Place 1 enema rectally once as needed    VITAMIN B-2 (RIBOFLAVIN) 100 MG TABS TABLET    Take Palpations: Abdomen is soft. There is no mass. Tenderness: There is no abdominal tenderness. There is no right CVA tenderness, left CVA tenderness, guarding or rebound. Hernia: No hernia is present. Musculoskeletal: Normal range of motion. Comments: Upon examination patient is tender outpatient over the right cervical paraspinal musculature and associated trapezius musculature. Tender palpation over the posterior right shoulder. Pain reproducible with movement of the right shoulder specifically internal and external rotation. Negative speeds. Negative empty can. Negative drop arm. Patient does have positive Spurling. No midline tenderness of cervical, thoracic or lumbar spine. No crepitus or step-off. No rashes or lesions. No skin changes. No edema, ecchymosis, erythema or warmth noted to the right upper extremity. Radial pulse and capillary refill brisk. Sensation intact to the radial ulnar aspects of distal fingertips. Full range of motion strength in distal median, ulnar and radial nerve distribution. Compartments soft. Full range of motion strength of the wrist, elbow and shoulder. No weakness noted with comparison to the left. Lymphadenopathy:      Cervical: No cervical adenopathy. Skin:     General: Skin is warm and dry. Coloration: Skin is not pale. Findings: No erythema. Neurological:      Mental Status: She is alert and oriented to person, place, and time.    Psychiatric:         Behavior: Behavior normal.         DIAGNOSTIC RESULTS   LABS:    Labs Reviewed   CBC WITH AUTO DIFFERENTIAL - Abnormal; Notable for the following components:       Result Value    Lymphocytes Absolute 0.6 (*)     All other components within normal limits    Narrative:     Performed at:  [facility] [address], [place], [address]   Phone [phone]   COMPREHENSIVE METABOLIC PANEL - Abnormal; Notable for the following components: documented results. Otherwise unremarkable. Patient given Norco, Norflex and Lidoderm here in the ED. Patient has reassuring neurologic semination here in ED with reproducible pain with movement and palpation. Appears to be musculoskeletal.  Patient is currently treated for pulmonary embolism and no clinical findings here on exam physical exam to suggest DVT. Already anticoagulated on Coumadin does not need emergent Doppler ultrasound at this time. Doppler ultrasound unfortunately not available given weekend and given the fact the patient is already on appropriate treatment with Coumadin do not believe further emergent ultrasound indicated this time. Believe symptoms most likely musculoskeletal etiology. No specific bony tenderness and full range of motion and strength without any trauma reported by patient or facility. Do not believe imaging indicated. Will treat as controlled musculoskeletal etiology with acetaminophen, Lidoderm and Robaxin for home. Follow-up with PCP. Return ED for any worsening symptoms. Low suspicion for acute fracture, dislocation, septic arthritis, gout, DVT, arterial occlusion, significant nerve involvement, vascular compromise, compartment syndrome, epidural abscess, spinal stenosis, significant cord compression, intracranial abnormality, intrathoracic abnormality or other emergent condition at this time. FINAL IMPRESSION      1. Acute pain of right shoulder    2. Hx of pulmonary embolus    3. Anticoagulated          DISPOSITION/PLAN   DISPOSITION Discharge - Pending Orders Complete 03/28/2020 01:10:47 PM      PATIENT REFERREDTO:  William Alexander    Schedule an appointment as soon as possible for a visit   For a Re-check in  3-5   days.       DISCHARGE MEDICATIONS:  New Prescriptions    ACETAMINOPHEN (AMINOFEN) 325 MG TABLET    Take 2 tablets by mouth every 6 hours as needed for Pain    LIDOCAINE (LIDODERM) 5 %    Place 1 patch onto the skin daily 12 hours on, 12 hours

## 2020-03-29 LAB
EKG ATRIAL RATE: 91 BPM
EKG DIAGNOSIS: NORMAL
EKG P AXIS: 36 DEGREES
EKG P-R INTERVAL: 152 MS
EKG Q-T INTERVAL: 364 MS
EKG QRS DURATION: 84 MS
EKG QTC CALCULATION (BAZETT): 447 MS
EKG R AXIS: 46 DEGREES
EKG T AXIS: 61 DEGREES
EKG VENTRICULAR RATE: 91 BPM

## 2020-03-29 PROCEDURE — 93010 ELECTROCARDIOGRAM REPORT: CPT | Performed by: INTERNAL MEDICINE

## 2021-10-29 ENCOUNTER — HOSPITAL ENCOUNTER (EMERGENCY)
Age: 61
Discharge: HOME | End: 2021-10-29
Payer: MEDICARE

## 2021-10-29 ENCOUNTER — APPOINTMENT (OUTPATIENT)
Dept: CT IMAGING | Age: 61
End: 2021-10-29
Payer: MEDICARE

## 2021-10-29 VITALS
OXYGEN SATURATION: 92 % | SYSTOLIC BLOOD PRESSURE: 151 MMHG | WEIGHT: 205 LBS | HEART RATE: 79 BPM | RESPIRATION RATE: 18 BRPM | BODY MASS INDEX: 33.09 KG/M2 | TEMPERATURE: 99.2 F | DIASTOLIC BLOOD PRESSURE: 92 MMHG

## 2021-10-29 DIAGNOSIS — S00.03XA HEMATOMA OF SCALP, INITIAL ENCOUNTER: ICD-10-CM
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
## 2021-10-29 LAB
INR BLD: 1.39 (ref 0.88–1.12)
PROTHROMBIN TIME: 15.9 SEC (ref 9.9–12.7)

## 2021-10-29 PROCEDURE — 36415 COLL VENOUS BLD VENIPUNCTURE: CPT

## 2021-10-29 PROCEDURE — 85610 PROTHROMBIN TIME: CPT

## 2021-10-29 PROCEDURE — 72125 CT NECK SPINE W/O DYE: CPT

## 2021-10-29 PROCEDURE — 99284 EMERGENCY DEPT VISIT MOD MDM: CPT

## 2021-10-29 PROCEDURE — 6370000000 HC RX 637 (ALT 250 FOR IP): Performed by: PHYSICIAN ASSISTANT

## 2021-10-29 PROCEDURE — 70450 CT HEAD/BRAIN W/O DYE: CPT

## 2021-10-29 RX ORDER — ACETAMINOPHEN 500 MG
1000 TABLET ORAL ONCE
Status: COMPLETED | OUTPATIENT
Start: 2021-10-29 | End: 2021-10-29

## 2021-10-29 RX ADMIN — ACETAMINOPHEN 1000 MG: 500 TABLET ORAL at 16:48

## 2021-10-29 ASSESSMENT — PAIN SCALES - GENERAL
PAINLEVEL_OUTOF10: 8
PAINLEVEL_OUTOF10: 8

## 2021-10-29 ASSESSMENT — ENCOUNTER SYMPTOMS
COUGH: 0
ABDOMINAL PAIN: 0
SHORTNESS OF BREATH: 0
VOMITING: 0
RHINORRHEA: 0
DIARRHEA: 0
NAUSEA: 0

## 2021-10-29 NOTE — ED PROVIDER NOTES
[facility]        Pt Name: Ju Blount  MRN: [id number]  [place] 1960  Date of evaluation: 10/29/2021  Provider: Pan Sanchez PA-C  PCP: Jane Valladares  Note Started: 5:34 PM EDT       [place]. I have evaluated this patient. My supervising physician was available for consultation. CHIEF COMPLAINT       Chief Complaint   Patient presents with    Fall     Pt from group home, tripped while using walker, knot on right side of head, no LOC        HISTORY OF PRESENT ILLNESS   (Location, Timing/Onset, Context/Setting, Quality, Duration, Modifying Factors, Severity, Associated Signs and Symptoms)  Note limiting factors. Chief Complaint: Quincy Moore is a 64 y.o. female who presents to the emergency department today for evaluation for a fall. The patient does have a history of dementia, apparently was at a group home, had a witnessed fall where she tripped while using a walker, and fell, hitting the back of her head. There was no loss of consciousness. No vomiting. Patient is on Coumadin. When the patient arrives to the emergency room she is complaining of pain to where she hit her head. The patient denies any neck pain. The patient denies been dizzy, lightheaded, having chest pain or shortness of breath before her fall, her fall seem to be mechanical in nature. Patient denies any vision changes. The patient otherwise has no complaints at this time    Nursing Notes were all reviewed and agreed with or any disagreements were addressed in the HPI. REVIEW OF SYSTEMS    (2-9 systems for level 4, 10 or more for level 5)     Review of Systems   Constitutional: Negative for activity change, appetite change, chills and fever. HENT: Negative for congestion and rhinorrhea. Respiratory: Negative for cough and shortness of breath. Cardiovascular: Negative for chest pain.    Gastrointestinal: Negative for abdominal pain, diarrhea, nausea and vomiting. Genitourinary: Negative for difficulty urinating, dysuria and hematuria. Neurological: Negative for weakness and numbness. Positives and Pertinent negatives as per HPI. Except as noted above in the ROS, all other systems were reviewed and negative. PAST MEDICAL HISTORY     Past Medical History:   Diagnosis Date    Arthritis     Dementia ([facility]     DYSPHAGIA, OROPHARYNGEAL     GERD (gastroesophageal reflux disease)     Hypocalcemia     Hypokalemia     Pancreatitis     Schizophrenia, paranoid type ([facility]     Seizures ([facility]     Vitamin deficiency          SURGICAL HISTORY     Past Surgical History:   Procedure Laterality Date    CHOLECYSTECTOMY      HYSTERECTOMY      TYMPANOPLASTY           CURRENTMEDICATIONS       Previous Medications    ACETAMINOPHEN (AMINOFEN) 325 MG TABLET    Take 2 tablets by mouth every 6 hours as needed for Pain    ACETAMINOPHEN (TYLENOL) 325 MG TABLET    Take 650 mg by mouth every 6 hours as needed. ALUMINUM & MAGNESIUM HYDROXIDE-SIMETHICONE (MYLANTA) 400-400-40 MG/5ML SUSP    Take 30 mLs by mouth every 4 hours as needed. BENZTROPINE (COGENTIN) 1 MG TABLET    Take 1 mg by mouth nightly. BENZYL ALCOHOL, ANESTHETIC, (ZILACTIN) 10 % GEL    Take by mouth    BISACODYL (DULCOLAX) 10 MG SUPPOSITORY    Place 10 mg rectally daily as needed for Constipation    BISACODYL (DULCOLAX) 5 MG EC TABLET    Take 10 mg by mouth daily as needed for Constipation    CALCIUM-VITAMIN D (OSCAL-500) 500-200 MG-UNIT PER TABLET    Take 1 tablet by mouth 2 times daily. CIPROFLOXACIN-DEXAMETHASONE (CIPRODEX) 0.3-0.1 % OTIC SUSPENSION    Place 4 drops into the left ear daily    COENZYME Q10 100 MG CAPS CAPSULE    Take 100 mg by mouth 2 times daily    DOCUSATE SODIUM (COLACE) 100 MG CAPSULE    Take 100 mg by mouth 2 times daily. FAMOTIDINE (PEPCID) 20 MG TABLET    Take 20 mg by mouth daily. FOLIC ACID (FOLVITE) 1 MG TABLET    Take 1 mg by mouth daily. IBUPROFEN (ADVIL;MOTRIN) 400 MG TABLET    Take 400 mg by mouth every 12 hours as needed for Pain    LACTULOSE (CHRONULAC) 10 GM/15ML SOLUTION    Take 20 g by mouth 2 times daily    LACTULOSE ENCEPHALOPATHY (ENULOSE) 10 GM/15ML SOLN    Take 30 mLs by mouth 2 times daily. LAMOTRIGINE (LAMICTAL) 100 MG TABLET    Take 100 mg by mouth daily morning    LAMOTRIGINE (LAMICTAL) 150 MG TABLET    Take 150 mg by mouth every evening. LEVETIRACETAM (KEPPRA) 750 MG TABLET    Take 750 mg by mouth 2 times daily. LIDOCAINE (LIDODERM) 5 %    Place 1 patch onto the skin daily 12 hours on, 12 hours off. LORAZEPAM (ATIVAN) 1 MG TABLET    Take 1 mg by mouth 2 times daily. MAGNESIUM OXIDE (MAG-OX) 400 MG TABLET    Take 600 mg by mouth daily    PHENOL 1.4 % LIQD MOUTH SPRAY    Take 1 drop by mouth every 2 hours as needed for Sore Throat    POTASSIUM CHLORIDE SA (KLOR-CON M20) 20 MEQ TABLET    Take 20 mEq by mouth daily. PROMETHAZINE (PHENERGAN) 12.5 MG TABLET    Take 12.5 mg by mouth every 6 hours as needed for Nausea    RISPERIDONE (RISPERDAL) 2 MG TABLET    Take 2 mg by mouth 2 times daily     SERTRALINE (ZOLOFT) 100 MG TABLET    Take 100 mg by mouth daily. SODIUM CHLORIDE (OCEAN, BABY AYR) 0.65 % NASAL SPRAY    1 spray by Nasal route daily    SODIUM PHOSPHATES (FLEET) 7-19 GM/118ML    Place 1 enema rectally once as needed    VITAMIN B-2 (RIBOFLAVIN) 100 MG TABS TABLET    Take 400 mg by mouth daily    VITAMIN D (CHOLECALCIFEROL) 400 UNITS TABS TABLET    Take 400 Units by mouth 2 times daily. VITAMIN E 400 UNIT CAPSULE    Take 400 Units by mouth daily. WARFARIN (COUMADIN) 6 MG TABLET    Take 6 mg by mouth once         ALLERGIES     Codeine and Pcn [penicillins]    FAMILYHISTORY     History reviewed. No pertinent family history. SOCIAL HISTORY       Social History     Tobacco Use    Smoking status: Never Smoker    Smokeless tobacco: Never Used   Substance Use Topics    Alcohol use: No    Drug use:  No SCREENINGS             PHYSICAL EXAM    (up to 7 for level 4, 8 or more for level 5)     ED Triage Vitals [10/29/21 1626]   BP Temp Temp src Pulse Resp SpO2 Height Weight   (!) 166/108 99.2 °F (37.3 °C) -- 91 19 99 % -- 205 lb (93 kg)       Physical Exam  Vitals and nursing note reviewed. Constitutional:       Appearance: She is well-developed. She is not diaphoretic. HENT:      Head: Normocephalic and atraumatic. Comments: There is a hematoma palpated to the right parietal scalp. No bogginess. No bony step-offs or crepitus. There is no fan sign raccoon sign. No CSF rhinorrhea. Right Ear: External ear normal.      Left Ear: External ear normal.      Nose: Nose normal.   Eyes:      General:         Right eye: No discharge. Left eye: No discharge. Neck:      Trachea: No tracheal deviation. Cardiovascular:      Rate and Rhythm: Normal rate and regular rhythm. Pulmonary:      Effort: Pulmonary effort is normal. No respiratory distress. Breath sounds: Normal breath sounds. No wheezing. Musculoskeletal:         General: Normal range of motion. Cervical back: Normal range of motion and neck supple. Comments: No midline spinal tenderness   Skin:     General: Skin is warm and dry. Neurological:      General: No focal deficit present. Mental Status: She is alert and oriented to person, place, and time. Psychiatric:         Behavior: Behavior normal.         DIAGNOSTIC RESULTS   LABS:    Labs Reviewed   PROTIME-INR - Abnormal; Notable for the following components:       Result Value    Protime 15.9 (*)     INR 1.39 (*)     All other components within normal limits    Narrative:     Performed at:  [facility] [address], [place], [address] [place] [address] Drive   Phone [phone]       When ordered only abnormal lab results are displayed. All other labs were within normal range or not returned as of this dictation. EKG:  When ordered, EKG's are interpreted by the Emergency Department Physician in the absence of a cardiologist.  Please see their note for interpretation of EKG. RADIOLOGY:   Non-plain film images such as CT, Ultrasound and MRI are read by the radiologist. Plain radiographic images are visualized and preliminarily interpreted by the ED Provider with the below findings:        Interpretation per the Radiologist below, if available at the time of this note:    CT CERVICAL SPINE WO CONTRAST   Final Result   No acute abnormality of the cervical spine. CT HEAD WO CONTRAST   Final Result   No acute intracranial abnormality. No results found. PROCEDURES   Unless otherwise noted below, none     Procedures    CRITICAL CARE TIME   N/A    CONSULTS:  None      EMERGENCY DEPARTMENT COURSE and DIFFERENTIAL DIAGNOSIS/MDM:   Vitals:    Vitals:    10/29/21 1800 10/29/21 1815 10/29/21 1830 10/29/21 1845   BP:  (!) 159/94 (!) 152/94 (!) 151/92   Pulse: 82 79 79 79   Resp: 18 20 18 18   Temp:       SpO2:  93% 93% 92%   Weight:           Patient was given the following medications:  Medications   acetaminophen (TYLENOL) tablet 1,000 mg (1,000 mg Oral Given 10/29/21 1648)           Briefly, this is a 80-year-old female from [facility] who had a witnessed mechanical fall, walking, tripped with walker and hit the back of her head. No loss of consciousness. No vomiting. She is on Coumadin. On examination, she does have a hematoma noted to the right parietal scalp. There are no neurological deficits    INR is 1.39. CT of head and cervical spine are negative. Patient is otherwise acting at baseline therefore she can be managed as an outpatient. Supportive care discussed at home. Recommended close follow-up with her primary care physician within 2 to 3 days for reevaluation. She is to return to the ED for any new or worsening symptoms. Patient voiced understanding is agreeable with plan. Stable for discharge. Results for orders placed or performed during the hospital encounter of 10/29/21   Protime-INR   Result Value Ref Range    Protime 15.9 (H) 9.9 - 12.7 sec    INR 1.39 (H) 0.88 - 1.12       I estimate there is LOW risk for SUBARACHNOID HEMORRHAGE, MENINGITIS, INTRACRANIAL HEMORRHAGE, SUBDURAL HEMATOMA, OR STROKE, thus I consider the discharge disposition reasonable. Hazel Terrell and I have discussed the diagnosis and risks, and we agree with discharging home to follow-up with their primary doctor. We also discussed returning to the Emergency Department immediately if new or worsening symptoms occur. We have discussed the symptoms which are most concerning (e.g., changing or worsening pain, weakness, vomiting, fever) that necessitate immediate return. FINAL IMPRESSION      1. Fall, initial encounter    2.  Hematoma of scalp, initial encounter          DISPOSITION/PLAN   DISPOSITION Decision To Discharge 10/29/2021 07:02:08 PM      PATIENT REFERRED TO:  Annamarie Benitez    Schedule an appointment as soon as possible for a visit in 2 days      [facility] Emergency Department  [address] [place] [address]  [phone]    As needed, If symptoms worsen      DISCHARGE MEDICATIONS:  New Prescriptions    No medications on file       DISCONTINUED MEDICATIONS:  Discontinued Medications    No medications on file              (Please note that portions of this note were completed with a voice recognition program.  Efforts were made to edit the dictations but occasionally words are mis-transcribed.)    Barak Jackson PA-C (electronically signed)            Barak Jackson PA-C  10/29/21 0274

## 2022-03-15 ENCOUNTER — HOSPITAL ENCOUNTER (EMERGENCY)
Age: 62
Discharge: HOME | End: 2022-03-15
Payer: MEDICARE

## 2022-03-15 ENCOUNTER — APPOINTMENT (OUTPATIENT)
Dept: GENERAL RADIOLOGY | Age: 62
End: 2022-03-15
Payer: MEDICARE

## 2022-03-15 VITALS
HEART RATE: 77 BPM | SYSTOLIC BLOOD PRESSURE: 132 MMHG | OXYGEN SATURATION: 96 % | DIASTOLIC BLOOD PRESSURE: 95 MMHG | TEMPERATURE: 98.1 F | RESPIRATION RATE: 16 BRPM

## 2022-03-15 DIAGNOSIS — S93.402A MODERATE LEFT ANKLE SPRAIN, INITIAL ENCOUNTER: ICD-10-CM

## 2022-03-15 DIAGNOSIS — S92.015A CLOSED NONDISPLACED FRACTURE OF BODY OF LEFT CALCANEUS, INITIAL ENCOUNTER: Primary | ICD-10-CM

## 2022-03-15 PROCEDURE — 99285 EMERGENCY DEPT VISIT HI MDM: CPT

## 2022-03-15 PROCEDURE — 73630 X-RAY EXAM OF FOOT: CPT

## 2022-03-15 PROCEDURE — 73610 X-RAY EXAM OF ANKLE: CPT

## 2022-03-15 ASSESSMENT — ENCOUNTER SYMPTOMS
COLOR CHANGE: 0
WHEEZING: 0
BACK PAIN: 0
VOMITING: 0
COUGH: 0
ABDOMINAL PAIN: 0
SHORTNESS OF BREATH: 0
NAUSEA: 0
DIARRHEA: 0

## 2022-03-15 NOTE — ED PROVIDER NOTES
Pancreatitis     Schizophrenia, paranoid type ([facility]     Seizures ([facility]     Vitamin deficiency          Procedure Laterality Date    CHOLECYSTECTOMY      HYSTERECTOMY      TYMPANOPLASTY         Medications:  Previous Medications    ACETAMINOPHEN (AMINOFEN) 325 MG TABLET    Take 2 tablets by mouth every 6 hours as needed for Pain    ACETAMINOPHEN (TYLENOL) 325 MG TABLET    Take 650 mg by mouth every 6 hours as needed. ALUMINUM & MAGNESIUM HYDROXIDE-SIMETHICONE (MYLANTA) 400-400-40 MG/5ML SUSP    Take 30 mLs by mouth every 4 hours as needed. BENZTROPINE (COGENTIN) 1 MG TABLET    Take 1 mg by mouth nightly. BENZYL ALCOHOL, ANESTHETIC, (ZILACTIN) 10 % GEL    Take by mouth    BISACODYL (DULCOLAX) 10 MG SUPPOSITORY    Place 10 mg rectally daily as needed for Constipation    BISACODYL (DULCOLAX) 5 MG EC TABLET    Take 10 mg by mouth daily as needed for Constipation    CALCIUM-VITAMIN D (OSCAL-500) 500-200 MG-UNIT PER TABLET    Take 1 tablet by mouth 2 times daily. CIPROFLOXACIN-DEXAMETHASONE (CIPRODEX) 0.3-0.1 % OTIC SUSPENSION    Place 4 drops into the left ear daily    COENZYME Q10 100 MG CAPS CAPSULE    Take 100 mg by mouth 2 times daily    DOCUSATE SODIUM (COLACE) 100 MG CAPSULE    Take 100 mg by mouth 2 times daily. FAMOTIDINE (PEPCID) 20 MG TABLET    Take 20 mg by mouth daily. FOLIC ACID (FOLVITE) 1 MG TABLET    Take 1 mg by mouth daily. IBUPROFEN (ADVIL;MOTRIN) 400 MG TABLET    Take 400 mg by mouth every 12 hours as needed for Pain    LACTULOSE (CHRONULAC) 10 GM/15ML SOLUTION    Take 20 g by mouth 2 times daily    LACTULOSE ENCEPHALOPATHY (ENULOSE) 10 GM/15ML SOLN    Take 30 mLs by mouth 2 times daily. LAMOTRIGINE (LAMICTAL) 100 MG TABLET    Take 100 mg by mouth daily morning    LAMOTRIGINE (LAMICTAL) 150 MG TABLET    Take 150 mg by mouth every evening. LEVETIRACETAM (KEPPRA) 750 MG TABLET    Take 750 mg by mouth 2 times daily.     LIDOCAINE (LIDODERM) 5 %    Place 1 patch onto the skin daily 12 hours on, 12 hours off. LORAZEPAM (ATIVAN) 1 MG TABLET    Take 1 mg by mouth 2 times daily. MAGNESIUM OXIDE (MAG-OX) 400 MG TABLET    Take 600 mg by mouth daily    PHENOL 1.4 % LIQD MOUTH SPRAY    Take 1 drop by mouth every 2 hours as needed for Sore Throat    POTASSIUM CHLORIDE SA (KLOR-CON M20) 20 MEQ TABLET    Take 20 mEq by mouth daily. PROMETHAZINE (PHENERGAN) 12.5 MG TABLET    Take 12.5 mg by mouth every 6 hours as needed for Nausea    RISPERIDONE (RISPERDAL) 2 MG TABLET    Take 2 mg by mouth 2 times daily     SERTRALINE (ZOLOFT) 100 MG TABLET    Take 100 mg by mouth daily. SODIUM CHLORIDE (OCEAN, BABY AYR) 0.65 % NASAL SPRAY    1 spray by Nasal route daily    SODIUM PHOSPHATES (FLEET) 7-19 GM/118ML    Place 1 enema rectally once as needed    VITAMIN B-2 (RIBOFLAVIN) 100 MG TABS TABLET    Take 400 mg by mouth daily    VITAMIN D (CHOLECALCIFEROL) 400 UNITS TABS TABLET    Take 400 Units by mouth 2 times daily. VITAMIN E 400 UNIT CAPSULE    Take 400 Units by mouth daily. WARFARIN (COUMADIN) 6 MG TABLET    Take 6 mg by mouth once         Review of Systems:  (2-9 systems needed)  Review of Systems   Constitutional: Negative for chills and fever. HENT: Negative for congestion. Respiratory: Negative for cough, shortness of breath and wheezing. Cardiovascular: Negative for chest pain. Gastrointestinal: Negative for abdominal pain, diarrhea, nausea and vomiting. Genitourinary: Negative for urgency. Musculoskeletal: Positive for arthralgias, joint swelling and myalgias. Negative for back pain. Skin: Negative for color change. Neurological: Negative for weakness, numbness and headaches. She denies any additional injuries associated with the fall, no head neck or back pain only the foot and ankle pain on the left       \"Positives and Pertinent negatives as per HPI\"    Physical Exam:  Physical Exam  Vitals and nursing note reviewed.    Constitutional: Appearance: She is well-developed. She is not diaphoretic. HENT:      Head: Normocephalic. Right Ear: External ear normal.      Left Ear: External ear normal.   Eyes:      General: No scleral icterus. Right eye: No discharge. Left eye: No discharge. Cardiovascular:      Rate and Rhythm: Normal rate. Pulmonary:      Effort: Pulmonary effort is normal. No respiratory distress. Breath sounds: Normal breath sounds. Abdominal:      Palpations: Abdomen is soft. Musculoskeletal:         General: Normal range of motion. Cervical back: Normal range of motion and neck supple. Comments: Patient has purple bruising, swelling and discomfort in her foot and ankle, the bruising is located across the distal aspect of her first through 4 metatarsals, she also has bruising to the entire left lateral malleoli. However there is no open wounds, laceration, break in skin integrity or skin tenting. Cap refill less than 3 seconds, dorsalis pedis and posterior tibialis pulses are both 2+. She is able to flex and extend the foot and ankle, her Achilles is intact. No obvious deformity noted and she is neurologically neurovascular intact. Skin:     General: Skin is warm. Capillary Refill: Capillary refill takes less than 2 seconds. Coloration: Skin is not pale. Neurological:      Mental Status: She is alert and oriented to person, place, and time. GCS: GCS eye subscore is 4. GCS verbal subscore is 5. GCS motor subscore is 6. Cranial Nerves: Cranial nerves are intact.       Comments: Patient is awake, alert following commands correctly, she is at baseline   Psychiatric:         Behavior: Behavior normal.         MEDICAL DECISION MAKING    Vitals:    Vitals:    03/15/22 0901 03/15/22 0902   BP:  (!) 132/95   Pulse: 77    Resp: 16    Temp: 98.1 °F (36.7 °C)    SpO2: 96%        LABS:Labs Reviewed - No data to display     Remainder of labs reviewed and were negative at this time or not returned at the time of this note. RADIOLOGY:   Non-plain film images such as CT, Ultrasound and MRI are read by the radiologist. IOpal, JENNIE - CNP have directly visualized the radiologic plain film image(s) with the below findings:      Interpretation per the Radiologist below, if available at the time of this note:    XR FOOT LEFT (MIN 3 VIEWS)   Final Result   Small anterolateral calcaneal corner fracture. XR ANKLE LEFT (MIN 3 VIEWS)   Final Result   Small anterolateral calcaneal fracture small avulsion fracture involving the   anterolateral corner of the calcaneus is again seen. No other acute osseous   abnormality. MEDICAL DECISION MAKING / ED COURSE:      PROCEDURES:   Procedures    None    Patient was given:  Medications - No data to display    Patient presents today with left foot and ankle pain status post fall in the bathroom, see HPI and physical exam.  After evaluation and examination the patient I did an x-ray of the foot and ankle, patient has a small anterolateral calcaneal corner fracture, otherwise no additional injuries except for a lot of bruising. I do believe she would benefit from rice however I also placed her in an orthopedic boot. However, patient will need to follow-up with Ortho in the next week just for reevaluation. At this time no concerns for severe fracture, open fracture, dislocation, DVT or septic joint. Patient can go home with outpatient follow-up. Therefore, shared medical decision was made to the patient myself and report was given back to nursing staff and nursing facility, patient will be discharged home with referral back to Ortho, PCP with follow-up in the next week. Placed in orthopedic boot, educated to alternate Tylenol and/or Motrin for discomfort, perform RICE and return to the ER for worsening or concerning symptoms. Report was called to the nursing staff at the [facility] by her primary nurse here.  The patient tolerated their visit well. I evaluated the patient. The physician was available for consultation as needed. The patient and / or the family were informed of the results of any tests, a time was given to answer questions, a plan was proposed and they agreed with plan. Patient was discharged back to the [facility] in stable condition. CLINICAL IMPRESSION:  1. Closed nondisplaced fracture of body of left calcaneus, initial encounter    2.  Moderate left ankle sprain, initial encounter        DISPOSITION Decision To Discharge 03/15/2022 10:10:46 AM      PATIENT REFERRED TO:  Dale Maher    Schedule an appointment as soon as possible for a visit in 3 days  Follow-up with the PCP in the next 2 to 3 days for reevaluation    Angela Licona MD  [address] [place] [address]  [phone]    Schedule an appointment as soon as possible for a visit in 1 week  Follow-up with any of the orthopedic specialist within this group in the next week for reevaluation      DISCHARGE MEDICATIONS:  New Prescriptions    No medications on file       DISCONTINUED MEDICATIONS:  Discontinued Medications    No medications on file              (Please note the MDM and HPI sections of this note were completed with a voice recognition program.  Efforts were made to edit the dictations but occasionally words are mis-transcribed.)    Electronically signed, JENNIE Gates CNP,          JENNIE Gates CNP  03/15/22 1019

## 2022-03-16 ENCOUNTER — TELEPHONE (OUTPATIENT)
Dept: ORTHOPEDIC SURGERY | Age: 62
End: 2022-03-16

## 2022-03-16 NOTE — TELEPHONE ENCOUNTER
While following up on [address]'s [address], noticed that the patient was scheduled for an appt on 3/24/22. This date is further out than provider typically likes since patient's injury occurred 5 days prior to being seen in the ER (per ER note). Spoke with the director of nursing to determine if the currently scheduled appt was needed due to transport or if we could arrange for the patient to be seen sooner. He is going to discuss with transport and return the call to reschedule. Offered an appointment on 3/18 at [facility]. Also offered 8 AM on 3/22 with Dmitri 30 or 1 or 3 PM on 3/22 with .

## 2022-03-24 ENCOUNTER — OFFICE VISIT (OUTPATIENT)
Dept: ORTHOPEDIC SURGERY | Age: 62
End: 2022-03-24
Payer: MEDICARE

## 2022-03-24 VITALS — BODY MASS INDEX: 33.43 KG/M2 | HEIGHT: 66 IN | WEIGHT: 208 LBS

## 2022-03-24 DIAGNOSIS — S92.022A CLOSED DISPLACED FRACTURE OF ANTERIOR PROCESS OF LEFT CALCANEUS, INITIAL ENCOUNTER: Primary | ICD-10-CM

## 2022-03-24 PROCEDURE — 3017F COLORECTAL CA SCREEN DOC REV: CPT | Performed by: ORTHOPAEDIC SURGERY

## 2022-03-24 PROCEDURE — G8417 CALC BMI ABV UP PARAM F/U: HCPCS | Performed by: ORTHOPAEDIC SURGERY

## 2022-03-24 PROCEDURE — 99203 OFFICE O/P NEW LOW 30 MIN: CPT | Performed by: ORTHOPAEDIC SURGERY

## 2022-03-24 PROCEDURE — G8427 DOCREV CUR MEDS BY ELIG CLIN: HCPCS | Performed by: ORTHOPAEDIC SURGERY

## 2022-03-24 PROCEDURE — 28400 CLTX CALCANEAL FX W/O MNPJ: CPT | Performed by: ORTHOPAEDIC SURGERY

## 2022-03-24 PROCEDURE — G8484 FLU IMMUNIZE NO ADMIN: HCPCS | Performed by: ORTHOPAEDIC SURGERY

## 2022-03-24 PROCEDURE — 1036F TOBACCO NON-USER: CPT | Performed by: ORTHOPAEDIC SURGERY

## 2022-03-24 NOTE — PROGRESS NOTES
CHIEF COMPLAIN: Left ankle pain / calcaneus anterior process fracture. DATE OF INJURY: 3/15/2022, DOT 3/24/2022    HISTORY:  Ms. Clarice Morales 64 y.o.  female MRDD lives in a group home came today with her care giver for the first visit for evaluation of a left ankle injury which occurred when she fell. She was first seen and evaluated in , where she was x-rayed, splinted and asked to f/u with Orthopedics. She is complaining of heel and ankle pain and swelling. This is better with elevation and worse with bearing any wt. The pain is sharp and not radiating. No other complaint. Past Medical History:   Diagnosis Date    Arthritis     Dementia ([facility] [address]     DYSPHAGIA, OROPHARYNGEAL     GERD (gastroesophageal reflux disease)     Hypocalcemia     Hypokalemia     Pancreatitis     Schizophrenia, paranoid type (HCC)     Seizures (HCC)     Vitamin deficiency        Past Surgical History:   Procedure Laterality Date    CHOLECYSTECTOMY      HYSTERECTOMY      TYMPANOPLASTY         Social History     Socioeconomic History    Marital status: Single     Spouse name: Not on file    Number of children: Not on file    Years of education: Not on file    Highest education level: Not on file   Occupational History    Not on file   Tobacco Use    Smoking status: Never Smoker    Smokeless tobacco: Never Used   Substance and Sexual Activity    Alcohol use: No    Drug use: No    Sexual activity: Not on file   Other Topics Concern    Not on file   Social History Narrative    Not on file     Social Determinants of Health     Financial Resource Strain:     Difficulty of Paying Living Expenses: Not on file   Food Insecurity:     Worried About Running Out of Food in the Last Year: Not on file    Radha of Food in the Last Year: Not on file   Transportation Needs:     Lack of Transportation (Medical): Not on file    Lack of Transportation (Non-Medical):  Not on file   Physical Activity:     Days of Exercise per Week: Not on file    Minutes of Exercise per Session: Not on file   Stress:     Feeling of Stress : Not on file   Social Connections:     Frequency of Communication with Friends and Family: Not on file    Frequency of Social Gatherings with Friends and Family: Not on file    Attends [employer] Services: Not on file    Active Member of Clubs or Organizations: Not on file    Attends Club or Organization Meetings: Not on file    Marital Status: Not on file   Intimate Partner Violence:     Fear of Current or Ex-Partner: Not on file    Emotionally Abused: Not on file    Physically Abused: Not on file    Sexually Abused: Not on file   Housing Stability:     Unable to Pay for Housing in the Last Year: Not on file    Number of [place] in the Last Year: Not on file    Unstable Housing in the Last Year: Not on file       No family history on file. Current Outpatient Medications on File Prior to Visit   Medication Sig Dispense Refill    coenzyme Q10 100 MG CAPS capsule Take 100 mg by mouth 2 times daily      warfarin (COUMADIN) 6 MG tablet Take 6 mg by mouth once      magnesium oxide (MAG-OX) 400 MG tablet Take 600 mg by mouth daily      vitamin B-2 (RIBOFLAVIN) 100 MG TABS tablet Take 400 mg by mouth daily      bisacodyl (DULCOLAX) 10 MG suppository Place 10 mg rectally daily as needed for Constipation      bisacodyl (DULCOLAX) 5 MG EC tablet Take 10 mg by mouth daily as needed for Constipation      Sodium Phosphates (FLEET) 7-19 GM/118ML Place 1 enema rectally once as needed      lidocaine (LIDODERM) 5 % Place 1 patch onto the skin daily 12 hours on, 12 hours off.  30 patch 0    acetaminophen (AMINOFEN) 325 MG tablet Take 2 tablets by mouth every 6 hours as needed for Pain 120 tablet 0    sodium chloride (OCEAN, BABY AYR) 0.65 % nasal spray 1 spray by Nasal route daily      lactulose (CHRONULAC) 10 GM/15ML solution Take 20 g by mouth 2 times daily      ciprofloxacin-dexamethasone (CIPRODEX) 0. 3-0.1 % otic suspension Place 4 drops into the left ear daily      lamoTRIgine (LAMICTAL) 100 MG tablet Take 100 mg by mouth daily morning      phenol 1.4 % LIQD mouth spray Take 1 drop by mouth every 2 hours as needed for Sore Throat      ibuprofen (ADVIL;MOTRIN) 400 MG tablet Take 400 mg by mouth every 12 hours as needed for Pain      promethazine (PHENERGAN) 12.5 MG tablet Take 12.5 mg by mouth every 6 hours as needed for Nausea      BENZYL ALCOHOL, ANESTHETIC, (ZILACTIN) 10 % GEL Take by mouth      acetaminophen (TYLENOL) 325 MG tablet Take 650 mg by mouth every 6 hours as needed.  aluminum & magnesium hydroxide-simethicone (MYLANTA) 400-400-40 MG/5ML SUSP Take 30 mLs by mouth every 4 hours as needed.  benztropine (COGENTIN) 1 MG tablet Take 1 mg by mouth nightly.  docusate sodium (COLACE) 100 MG capsule Take 100 mg by mouth 2 times daily.  lactulose encephalopathy (ENULOSE) 10 GM/15ML SOLN Take 30 mLs by mouth 2 times daily.  famotidine (PEPCID) 20 MG tablet Take 20 mg by mouth daily.  folic acid (FOLVITE) 1 MG tablet Take 1 mg by mouth daily.  potassium chloride SA (KLOR-CON M20) 20 MEQ tablet Take 20 mEq by mouth daily.  lamoTRIgine (LAMICTAL) 150 MG tablet Take 150 mg by mouth every evening.  levETIRAcetam (KEPPRA) 750 MG tablet Take 750 mg by mouth 2 times daily.  LORazepam (ATIVAN) 1 MG tablet Take 1 mg by mouth 2 times daily.  calcium-vitamin D (OSCAL-500) 500-200 MG-UNIT per tablet Take 1 tablet by mouth 2 times daily.  risperiDONE (RISPERDAL) 2 MG tablet Take 2 mg by mouth 2 times daily       sertraline (ZOLOFT) 100 MG tablet Take 100 mg by mouth daily.  vitamin D (CHOLECALCIFEROL) 400 UNITS TABS tablet Take 400 Units by mouth 2 times daily.  vitamin E 400 UNIT capsule Take 400 Units by mouth daily. No current facility-administered medications on file prior to visit.        Pertinent items are noted in HPI  Review of systems reviewed from Patient History Form and available in the patient's chart under the Media tab. No change noted. PHYSICAL EXAMINATION:  Ms. Joon June is a very pleasant 64 y.o.  female who presents today in no acute distress, awake, alert, and oriented. She is well dressed, nourished and  groomed. Patient with normal affect. Height is  5' 6\" (1.676 m), weight is 208 lb (94.3 kg), Body mass index is 33.57 kg/m². Resting respiratory rate is 16. Examination of the gait, showed that the patient walks with a walker, WB left leg and in a boot. Examination of both ankles showing a good range of motion of the left ankle and subtalar joints compare to the other side. There is moderate swelling that can be seen, as well anos ecchymosis. She has intact sensation and good pedal pulses. She has significant tenderness on deep palpation over the calcaneus anterior process of the left ankle. Skin intact. IMAGING:  Xrays, 3 views of the left foot dated 3/15/2022 from [facility],  were reviewed, and showed a minimally displaced fracture of the calcaneus anterior process. IMPRESSION:  Left ankle pain / calcaneus anterior process fracture. PLAN:  I discussed with Ost[place]he[place] Hahn and her care giver that the overall alignment of this fracture is good and that we can try to treat this non-operatively in a boot, WBAT. We discussed the risk of nonunion and or malunion. We will see her back in 6 weeks at which time we will get a new xray of the left foot. I discussed the overall alignment of the fracture with the  patient, and treatment options including both surgical and non-surgical treatment, and that my recommendation would be an open reduction and internal fixation given the amount of displacement and comminution of the calcaneus intraarticular fracture.      I discussed the risks and benefits of surgery with the patient, including but not limited to infection, bleeding, pain, injury to nerves or blood vessels failure of the surgery and need for additional surgery, and wound healing issues. All the patient's questions were answered. We discussed an expected post-operative course with [facility] for 3 months. She  is understanding of this and wishes to proceed.       Talita Park MD , MD

## 2022-05-24 ENCOUNTER — OFFICE VISIT (OUTPATIENT)
Dept: ORTHOPEDIC SURGERY | Age: 62
End: 2022-05-24

## 2022-05-24 VITALS — BODY MASS INDEX: 33.57 KG/M2 | HEIGHT: 66 IN

## 2022-05-24 DIAGNOSIS — S92.022A CLOSED DISPLACED FRACTURE OF ANTERIOR PROCESS OF LEFT CALCANEUS, INITIAL ENCOUNTER: Primary | ICD-10-CM

## 2022-05-24 PROCEDURE — 99024 POSTOP FOLLOW-UP VISIT: CPT | Performed by: ORTHOPAEDIC SURGERY

## 2022-06-05 NOTE — PROGRESS NOTES
CHIEF COMPLAIN: Left ankle pain / calcaneus anterior process fracture. DATE OF INJURY: 3/15/2022, DOT 3/24/2022    HISTORY:  Ms. Robb Hoffmann 64 y.o.  female MRDD lives in a group home came today with her care giver for f/u for evaluation of a left ankle injury which occurred when she fell. She was first seen and evaluated in , where she was x-rayed, splinted and asked to f/u with Orthopedics. She is complaining of heel and ankle pain and swelling. This is better with elevation and worse with bearing any wt. The pain is sharp and not radiating. No other complaint. Past Medical History:   Diagnosis Date    Arthritis     Dementia ([facility] [address]     DYSPHAGIA, OROPHARYNGEAL     GERD (gastroesophageal reflux disease)     Hypocalcemia     Hypokalemia     Pancreatitis     Schizophrenia, paranoid type (HCC)     Seizures (HCC)     Vitamin deficiency        Past Surgical History:   Procedure Laterality Date    CHOLECYSTECTOMY      HYSTERECTOMY      TYMPANOPLASTY         Social History     Socioeconomic History    Marital status: Single     Spouse name: Not on file    Number of children: Not on file    Years of education: Not on file    Highest education level: Not on file   Occupational History    Not on file   Tobacco Use    Smoking status: Never Smoker    Smokeless tobacco: Never Used   Substance and Sexual Activity    Alcohol use: No    Drug use: No    Sexual activity: Not on file   Other Topics Concern    Not on file   Social History Narrative    Not on file     Social Determinants of Health     Financial Resource Strain:     Difficulty of Paying Living Expenses: Not on file   Food Insecurity:     Worried About Running Out of Food in the Last Year: Not on file    Radha of Food in the Last Year: Not on file   Transportation Needs:     Lack of Transportation (Medical): Not on file    Lack of Transportation (Non-Medical):  Not on file   Physical Activity:     Days of Exercise per Week: Not on file    Minutes of Exercise per Session: Not on file   Stress:     Feeling of Stress : Not on file   Social Connections:     Frequency of Communication with Friends and Family: Not on file    Frequency of Social Gatherings with Friends and Family: Not on file    Attends Che[employer] Services: Not on file    Active Member of Clubs or Organizations: Not on file    Attends Club or Organization Meetings: Not on file    Marital Status: Not on file   Intimate Partner Violence:     Fear of Current or Ex-Partner: Not on file    Emotionally Abused: Not on file    Physically Abused: Not on file    Sexually Abused: Not on file   Housing Stability:     Unable to Pay for Housing in the Last Year: Not on file    Number of [place] in the Last Year: Not on file    Unstable Housing in the Last Year: Not on file       History reviewed. No pertinent family history. Current Outpatient Medications on File Prior to Visit   Medication Sig Dispense Refill    coenzyme Q10 100 MG CAPS capsule Take 100 mg by mouth 2 times daily      warfarin (COUMADIN) 6 MG tablet Take 6 mg by mouth once      magnesium oxide (MAG-OX) 400 MG tablet Take 600 mg by mouth daily      vitamin B-2 (RIBOFLAVIN) 100 MG TABS tablet Take 400 mg by mouth daily      bisacodyl (DULCOLAX) 10 MG suppository Place 10 mg rectally daily as needed for Constipation      bisacodyl (DULCOLAX) 5 MG EC tablet Take 10 mg by mouth daily as needed for Constipation      Sodium Phosphates (FLEET) 7-19 GM/118ML Place 1 enema rectally once as needed      lidocaine (LIDODERM) 5 % Place 1 patch onto the skin daily 12 hours on, 12 hours off.  30 patch 0    acetaminophen (AMINOFEN) 325 MG tablet Take 2 tablets by mouth every 6 hours as needed for Pain 120 tablet 0    sodium chloride (OCEAN, BABY AYR) 0.65 % nasal spray 1 spray by Nasal route daily      lactulose (CHRONULAC) 10 GM/15ML solution Take 20 g by mouth 2 times daily      ciprofloxacin-dexamethasone (CIPRODEX) 0.3-0.1 % otic suspension Place 4 drops into the left ear daily      lamoTRIgine (LAMICTAL) 100 MG tablet Take 100 mg by mouth daily morning      phenol 1.4 % LIQD mouth spray Take 1 drop by mouth every 2 hours as needed for Sore Throat      ibuprofen (ADVIL;MOTRIN) 400 MG tablet Take 400 mg by mouth every 12 hours as needed for Pain      promethazine (PHENERGAN) 12.5 MG tablet Take 12.5 mg by mouth every 6 hours as needed for Nausea      BENZYL ALCOHOL, ANESTHETIC, (ZILACTIN) 10 % GEL Take by mouth      acetaminophen (TYLENOL) 325 MG tablet Take 650 mg by mouth every 6 hours as needed.  aluminum & magnesium hydroxide-simethicone (MYLANTA) 400-400-40 MG/5ML SUSP Take 30 mLs by mouth every 4 hours as needed.  benztropine (COGENTIN) 1 MG tablet Take 1 mg by mouth nightly.  docusate sodium (COLACE) 100 MG capsule Take 100 mg by mouth 2 times daily.  lactulose encephalopathy (ENULOSE) 10 GM/15ML SOLN Take 30 mLs by mouth 2 times daily.  famotidine (PEPCID) 20 MG tablet Take 20 mg by mouth daily.  folic acid (FOLVITE) 1 MG tablet Take 1 mg by mouth daily.  potassium chloride SA (KLOR-CON M20) 20 MEQ tablet Take 20 mEq by mouth daily.  lamoTRIgine (LAMICTAL) 150 MG tablet Take 150 mg by mouth every evening.  levETIRAcetam (KEPPRA) 750 MG tablet Take 750 mg by mouth 2 times daily.  LORazepam (ATIVAN) 1 MG tablet Take 1 mg by mouth 2 times daily.  calcium-vitamin D (OSCAL-500) 500-200 MG-UNIT per tablet Take 1 tablet by mouth 2 times daily.  risperiDONE (RISPERDAL) 2 MG tablet Take 2 mg by mouth 2 times daily       sertraline (ZOLOFT) 100 MG tablet Take 100 mg by mouth daily.  vitamin D (CHOLECALCIFEROL) 400 UNITS TABS tablet Take 400 Units by mouth 2 times daily.  vitamin E 400 UNIT capsule Take 400 Units by mouth daily. No current facility-administered medications on file prior to visit.        Pertinent items are noted in HPI  Review of systems reviewed from Patient History Form and available in the patient's chart under the Media tab. No change noted. PHYSICAL EXAMINATION:  Ms. Kristopher Miller is a very pleasant 64 y.o.  female who presents today in no acute distress, awake, alert, and oriented. She is well dressed, nourished and  groomed. Patient with normal affect. Height is  5' 6\" (1.676 m), weight is  , Body mass index is 33.57 kg/m². Resting respiratory rate is 16. Examination of the gait, showed that the patient walks with a walker, WB left leg. Examination of both ankles showing a good range of motion of the left ankle and subtalar joints compare to the other side. There is mild swelling that can be seen, no ecchymosis. She has intact sensation and good pedal pulses. She has minimal tenderness on deep palpation over the calcaneus anterior process of the left ankle. Skin intact. IMAGING:  Xrays, 3 views of the left foot taken today in the office, were reviewed, and showed a minimally displaced fracture of the calcaneus anterior process. IMPRESSION:  Left ankle pain / calcaneus anterior process fracture. PLAN:  I discussed with Akira Carreon and her care giver that the overall alignment of this fracture is good and healing well. We discussed the risk of nonunion and or malunion. We will see her back in 2 months PRN at which time we will get a new xray of the left foot.          Luly Mckeon MD